# Patient Record
Sex: FEMALE | Race: WHITE | Employment: UNEMPLOYED | URBAN - METROPOLITAN AREA
[De-identification: names, ages, dates, MRNs, and addresses within clinical notes are randomized per-mention and may not be internally consistent; named-entity substitution may affect disease eponyms.]

---

## 2017-04-10 ENCOUNTER — APPOINTMENT (OUTPATIENT)
Dept: CT IMAGING | Age: 57
End: 2017-04-10
Attending: EMERGENCY MEDICINE
Payer: COMMERCIAL

## 2017-04-10 ENCOUNTER — HOSPITAL ENCOUNTER (EMERGENCY)
Age: 57
Discharge: HOME | End: 2017-04-10
Attending: EMERGENCY MEDICINE
Payer: COMMERCIAL

## 2017-04-10 VITALS
BODY MASS INDEX: 40.94 KG/M2 | HEART RATE: 79 BPM | HEIGHT: 69 IN | TEMPERATURE: 97.9 F | WEIGHT: 276.4 LBS | DIASTOLIC BLOOD PRESSURE: 82 MMHG | RESPIRATION RATE: 18 BRPM | OXYGEN SATURATION: 96 % | SYSTOLIC BLOOD PRESSURE: 143 MMHG

## 2017-04-10 DIAGNOSIS — H57.02 ANISOCORIA: Primary | ICD-10-CM

## 2017-04-10 LAB
ANION GAP BLD CALC-SCNC: 17 MMOL/L (ref 5–15)
ATRIAL RATE: 69 BPM
BUN BLD-MCNC: 8 MG/DL (ref 9–20)
CA-I BLD-MCNC: 1.16 MMOL/L (ref 1.12–1.32)
CALCULATED P AXIS, ECG09: 41 DEGREES
CALCULATED R AXIS, ECG10: -4 DEGREES
CALCULATED T AXIS, ECG11: 22 DEGREES
CHLORIDE BLD-SCNC: 105 MMOL/L (ref 98–107)
CO2 BLD-SCNC: 26 MMOL/L (ref 21–32)
CREAT BLD-MCNC: 0.6 MG/DL (ref 0.6–1.3)
DIAGNOSIS, 93000: NORMAL
GLUCOSE BLD-MCNC: 99 MG/DL (ref 65–100)
HCT VFR BLD CALC: 44 % (ref 35–47)
HGB BLD-MCNC: 15 GM/DL (ref 11.5–16)
P-R INTERVAL, ECG05: 162 MS
POTASSIUM BLD-SCNC: 3.7 MMOL/L (ref 3.5–5.1)
Q-T INTERVAL, ECG07: 428 MS
QRS DURATION, ECG06: 88 MS
QTC CALCULATION (BEZET), ECG08: 458 MS
SERVICE CMNT-IMP: ABNORMAL
SODIUM BLD-SCNC: 143 MMOL/L (ref 136–145)
VENTRICULAR RATE, ECG03: 69 BPM

## 2017-04-10 PROCEDURE — 96375 TX/PRO/DX INJ NEW DRUG ADDON: CPT

## 2017-04-10 PROCEDURE — 74011250636 HC RX REV CODE- 250/636: Performed by: EMERGENCY MEDICINE

## 2017-04-10 PROCEDURE — 74011000258 HC RX REV CODE- 258: Performed by: EMERGENCY MEDICINE

## 2017-04-10 PROCEDURE — 70496 CT ANGIOGRAPHY HEAD: CPT

## 2017-04-10 PROCEDURE — 74011000250 HC RX REV CODE- 250: Performed by: EMERGENCY MEDICINE

## 2017-04-10 PROCEDURE — 96374 THER/PROPH/DIAG INJ IV PUSH: CPT

## 2017-04-10 PROCEDURE — 93005 ELECTROCARDIOGRAM TRACING: CPT

## 2017-04-10 PROCEDURE — 74011636320 HC RX REV CODE- 636/320: Performed by: EMERGENCY MEDICINE

## 2017-04-10 PROCEDURE — 70450 CT HEAD/BRAIN W/O DYE: CPT

## 2017-04-10 PROCEDURE — 99285 EMERGENCY DEPT VISIT HI MDM: CPT

## 2017-04-10 PROCEDURE — 80047 BASIC METABLC PNL IONIZED CA: CPT

## 2017-04-10 RX ORDER — LEVOTHYROXINE SODIUM 112 UG/1
112 TABLET ORAL
COMMUNITY

## 2017-04-10 RX ORDER — TETRACAINE HYDROCHLORIDE 5 MG/ML
1 SOLUTION OPHTHALMIC
Status: COMPLETED | OUTPATIENT
Start: 2017-04-10 | End: 2017-04-10

## 2017-04-10 RX ORDER — DIPHENHYDRAMINE HYDROCHLORIDE 50 MG/ML
50 INJECTION, SOLUTION INTRAMUSCULAR; INTRAVENOUS
Status: COMPLETED | OUTPATIENT
Start: 2017-04-10 | End: 2017-04-10

## 2017-04-10 RX ORDER — GUAIFENESIN 100 MG/5ML
81 LIQUID (ML) ORAL DAILY
COMMUNITY

## 2017-04-10 RX ORDER — DILTIAZEM HYDROCHLORIDE 120 MG/1
120 CAPSULE, EXTENDED RELEASE ORAL DAILY
COMMUNITY

## 2017-04-10 RX ORDER — SODIUM CHLORIDE 0.9 % (FLUSH) 0.9 %
10 SYRINGE (ML) INJECTION
Status: COMPLETED | OUTPATIENT
Start: 2017-04-10 | End: 2017-04-10

## 2017-04-10 RX ORDER — PILOCARPINE HYDROCHLORIDE 10 MG/ML
1 SOLUTION/ DROPS OPHTHALMIC
Status: DISCONTINUED | OUTPATIENT
Start: 2017-04-10 | End: 2017-04-10 | Stop reason: HOSPADM

## 2017-04-10 RX ORDER — HYDROGEN PEROXIDE 3 %
20 SOLUTION, NON-ORAL MISCELLANEOUS DAILY
COMMUNITY

## 2017-04-10 RX ADMIN — METHYLPREDNISOLONE SODIUM SUCCINATE 125 MG: 125 INJECTION, POWDER, FOR SOLUTION INTRAMUSCULAR; INTRAVENOUS at 15:24

## 2017-04-10 RX ADMIN — SODIUM CHLORIDE 100 ML: 900 INJECTION, SOLUTION INTRAVENOUS at 15:35

## 2017-04-10 RX ADMIN — DIPHENHYDRAMINE HYDROCHLORIDE 50 MG: 50 INJECTION, SOLUTION INTRAMUSCULAR; INTRAVENOUS at 15:24

## 2017-04-10 RX ADMIN — Medication 10 ML: at 15:34

## 2017-04-10 RX ADMIN — TETRACAINE HYDROCHLORIDE 1 DROP: 5 SOLUTION OPHTHALMIC at 16:14

## 2017-04-10 RX ADMIN — IOPAMIDOL 100 ML: 755 INJECTION, SOLUTION INTRAVENOUS at 15:34

## 2017-04-10 NOTE — DISCHARGE INSTRUCTIONS
Learning About Anisocoria  What is anisocoria? Anisocoria (say \"an-eye-soh-KOR-ee-uh) is a difference in the size of your pupils. The pupil is the black area in the center of your iris. The iris is the colored part of your eye. The iris controls the pupil to let the right amount of light into the eye. In bright light, the pupil narrows (constricts) to let in less light. In dim light, the pupil widens (dilates) to let in more light. When you have anisocoria, one of your pupils does not constrict or dilate as well as the other one. So the pupils look uneven. A slight difference in the size of the pupils is normal for some people. But in other cases this condition is the result of a medical problem. Examples include an injury to the eye, an infection, or nerve damage. What are the symptoms? Having different-sized pupils usually doesn't cause any symptoms. And by itself, it is rarely a cause for concern. Anisocoria is more likely to be the sign of a serious problem if it occurs along with other symptoms, such as:  · A droopy upper eyelid. · Eye pain. · A severe headache. · Vision problems, such as double vision. · Loss of vision. Tell your doctor right away if you have any of these symptoms. What can you expect when you have it? An eye doctor (ophthalmologist) will do a complete eye exam. The doctor will:  · See how your pupils respond to bright and dim light. · Look at the inside of your eyes. · Check how well your eyes focus. The doctor might ask to see old photos to find out how long your pupils have been uneven. You may have other tests to help rule out a serious cause of uneven pupils. For example, the doctor might check how your eyes respond to eye drops. Or you might have an imaging test, such as an MRI. If a medical problem is causing the difference in pupil size, your treatment will depend on the cause.  You will not need any treatment if your uneven pupils are not linked to a medical problem. Follow-up care is a key part of your treatment and safety. Be sure to make and go to all appointments, and call your doctor if you are having problems. It's also a good idea to know your test results and keep a list of the medicines you take. Where can you learn more? Go to http://[facility].info/. Enter [id number] in the search box to learn more about \"Learning About Anisocoria. \"  Current as of: May 23, 2016  Content Version: 11.2  © 1760-4247 [employer]. Care instructions adapted under license by [employer] (which disclaims liability or warranty for this information). If you have questions about a medical condition or this instruction, always ask your healthcare professional. [address] any warranty or liability for your use of this information. We hope that we have addressed all of your medical concerns. The examination and treatment you received in the Emergency Department were for an emergent problem and were not intended as complete care. It is important that you follow up with your healthcare provider(s) for ongoing care. If your symptoms worsen or do not improve as expected, and you are unable to reach your usual health care provider(s), you should return to the Emergency Department. Today's healthcare is undergoing tremendous change, and patient satisfaction surveys are one of the many tools to assess the quality of medical care. You may receive a survey from the [employer] regarding your experience in the Emergency Department. I hope that your experience has been completely positive, particularly the medical care that I provided. As such, please participate in the survey; anything less than excellent does not meet my expectations or intentions. [facility] and [address] [place] [address] participate in nationally recognized quality of care measures.   If your blood pressure is greater than 120/80, as reported below, we urge that you seek medical care to address the potential of high blood pressure, commonly known as hypertension. Hypertension can be hereditary or can be caused by certain medical conditions, pain, stress, or \"white coat syndrome. \"       Please make an appointment with your health care provider(s) for follow up of your Emergency Department visit. VITALS:   Patient Vitals for the past 8 hrs:   Temp Pulse Resp BP SpO2   04/10/17 1224 98.4 °F (36.9 °C) 88 16 177/83 97 %          Thank you for allowing us to provide you with medical care today. We realize that you have many choices for your emergency care needs. Please choose us in the future for any continued health care needs. Mihir Jenkins, [address] [place] [address].   Office: [phone]            Recent Results (from the past 24 hour(s))   EKG, 12 LEAD, INITIAL    Collection Time: 04/10/17  2:31 PM   Result Value Ref Range    Ventricular Rate 67 BPM    Atrial Rate 67 BPM    P-R Interval 168 ms    QRS Duration 88 ms    Q-T Interval 428 ms    QTC Calculation (Bezet) 452 ms    Calculated P Axis 46 degrees    Calculated R Axis -6 degrees    Calculated T Axis 11 degrees    Diagnosis Normal sinus rhythm  No previous ECGs available      POC CHEM8    Collection Time: 04/10/17  2:43 PM   Result Value Ref Range    Calcium, ionized (POC) 1.16 1.12 - 1.32 MMOL/L    Sodium (POC) 143 136 - 145 MMOL/L    Potassium (POC) 3.7 3.5 - 5.1 MMOL/L    Chloride (POC) 105 98 - 107 MMOL/L    CO2 (POC) 26 21 - 32 MMOL/L    Anion gap (POC) 17 (H) 5 - 15 mmol/L    Glucose (POC) 99 65 - 100 MG/DL    BUN (POC) 8 (L) 9 - 20 MG/DL    Creatinine (POC) 0.6 0.6 - 1.3 MG/DL    GFR-AA (POC) >60 >60 ml/min/1.73m2    GFR, non-AA (POC) >60 >60 ml/min/1.73m2    Hemoglobin (POC) 15.0 11.5 - 16.0 GM/DL    Hematocrit (POC) 44 35.0 - 47.0 %    Comment Comment Not Indicated.          Ct Head Wo Cont    Result Date: 4/10/2017  INDICATION: Unequal pupils Exam: Noncontrast CT of the brain is performed with 5 mm collimation. CT dose reduction was achieved with the use of the standardized protocol tailored for this examination and automatic exposure control for dose modulation. FINDINGS: There is no acute intracranial hemorrhage, mass, mass effect or herniation. Ventricular system is normal. The gray-white matter differentiation is well-preserved. The mastoid air cells are well pneumatized. The visualized paranasal sinuses are normal.     IMPRESSION: No acute intracranial hemorrhage, mass or infarct. Cta Head Neck W Cont    Result Date: 4/10/2017  EXAM:  CTA HEAD NECK W CONT INDICATION:   dilated right pupil, eval for aneurysm, hx of thyroid cancer COMPARISON:  None. CONTRAST:  100 mL of Isovue-370. TECHNIQUE:  Unenhanced  images were obtained to localize the volume for acquisition. Multislice helical axial CT angiography was performed from the aortic arch to the top of the head during uneventful rapid bolus intravenous contrast administration. Coronal and sagittal reformations and 3D post processing was performed. CT dose reduction was achieved through use of a standardized protocol tailored for this examination and automatic exposure control for dose modulation. Adaptive statistical iterative reconstruction (ASIR) was utilized. FINDINGS: CTA NECK There is conventional three vessel arch anatomy. The bilateral subclavian, common carotid, and internal carotid arteries are patent with no flow-limiting stenosis. % of right carotid artery stenosis: 0. % of left carotid artery stenosis: 0. NASCET method was utilized for calculating stenosis. The vertebral arteries are codominant and patent. The cervical soft tissues are unremarkable. There are degenerative changes of the cervical spine. The right thyroid is surgically absent. Left thyroid appears unremarkable.  Soft tissues are otherwise grossly unremarkable. Desiccated debris seen within the sphenoid sinus. CTA HEAD The vertebral arteries are codominant. The basilar artery and its branches are normal. The internal carotid, anterior cerebral, and middle cerebral arteries are patent. There is no flow-limiting intracranial stenosis. There is no aneurysm. There are no sizable posterior communicating arteries. IMPRESSION:  No aneurysm, dissection, or large vessel occlusion. Incidental findings as above including chronic sphenoid sinus disease.

## 2017-04-10 NOTE — ED NOTES
Patient has received discharge instructions from ER MD, verbalizes understanding.   Ambulatory upon discharge with daughter

## 2017-04-10 NOTE — ED TRIAGE NOTES
TRIAGE NOTE: \"My daughter noticed that I have one pupil that is really dilated. I was at [facility] and they called 911 for me. The EMS providers told me I should come immediately to have a CT done to rule out an aneurysm. I had to wait for my daughter so someone from the school drove me over. \" Patient's right pupil is much larger than the left but reactive to light

## 2017-04-10 NOTE — ED PROVIDER NOTES
HPI Comments: 59 y.o. female with past medical history significant for thyroid CA, A-fib, inappropriate sinus tachycardia, and s/p partial thyroidectomy, and hysterectomy who presents from [address] [place] [address] via private vehicle with chief complaint of unilateral eye dilation. Pt reports that she is from [place] and was visiting campus with her daughter when the daughter noticed that the pt's right eye was significantly more dilated than the left. Pt was evaluated by EMS who told the pt to come to the ED to r/o aneurysm. She reports having no other complaints or any other signs of stroke on scene. Pt wears contacts and reports no changes to lenses or solution. Pt is on no new medications or eye drops. No head injury. No h/o HTN and pt is not on any anticoagulants. Pt reports an allergy to IVP dye - anaphylaxis. Pt has since had contrast dye without issue - she may have been pretreated. Pt denies all of the following: headache, nausea, facial droop, weakness, visual disturbance, and eye pain. There are no other acute medical concerns at this time. Social hx: Never smoker. No alcohol use. Pt is [facility] visit and lives in [place]. Note written by Arabella Yap, as dictated by Anton Krueger MD 2:02 PM      The history is provided by the patient. Past Medical History:   Diagnosis Date    Atrial fibrillation (HCC)     Inappropriate sinus tachycardia (HCC)     Thyroid cancer (HCC)        Past Surgical History:   Procedure Laterality Date    HX HYSTERECTOMY      HX PARTIAL THYROIDECTOMY           History reviewed. No pertinent family history. Social History     Social History    Marital status:      Spouse name: N/A    Number of children: N/A    Years of education: N/A     Occupational History    Not on file.      Social History Main Topics    Smoking status: Never Smoker    Smokeless tobacco: Not on file    Alcohol use Yes      Comment: on occasion     Drug use: Not on file    Sexual activity: Not on file     Other Topics Concern    Not on file     Social History Narrative    No narrative on file         ALLERGIES: Latex and Iodinated contrast media - oral and iv dye    Review of Systems   Eyes: Negative for photophobia, pain and visual disturbance. Right pupil dilated   Respiratory: Negative for cough and shortness of breath. Cardiovascular: Negative for chest pain. Gastrointestinal: Negative for abdominal pain, nausea and vomiting. Neurological: Negative for dizziness, speech difficulty, weakness and headaches. All other systems reviewed and are negative.       Vitals:    04/10/17 1224   BP: 177/83   Pulse: 88   Resp: 16   Temp: 98.4 °F (36.9 °C)   SpO2: 97%   Weight: 125.4 kg (276 lb 6.4 oz)   Height: 5' 9\" (1.753 m)            Physical Exam   Physical Examination: General appearance - alert, well appearing, and in no distress, oriented to person, place, and time and normal appearing weight  Eyes - right pupil more dilated than left, dilated 4mm, minimally reactive to light but has sluggish direct and consensual response, left pupil 2mm reacts to direct and consensual light, extraocular eye movements intact  Neck - supple, no significant adenopathy  Chest - clear to auscultation, no wheezes, rales or rhonchi, symmetric air entry  Heart - normal rate, regular rhythm, normal S1, S2, no murmurs, rubs, clicks or gallops  Abdomen - soft, nontender, nondistended, no masses or organomegaly  Back exam - full range of motion, no tenderness, palpable spasm or pain on motion  Neurological - alert, oriented, normal speech, no focal findings or movement disorder noted  Musculoskeletal - no joint tenderness, deformity or swelling  Extremities - peripheral pulses normal, no pedal edema, no clubbing or cyanosis  Skin - normal coloration and turgor, no rashes, no suspicious skin lesions noted  MDM  Number of Diagnoses or Management Options  Anisocoria:      Amount and/or Complexity of Data Reviewed  Clinical lab tests: ordered and reviewed  Tests in the radiology section of CPT®: ordered and reviewed  Obtain history from someone other than the patient: yes (daughter)  Discuss the patient with other providers: yes (Radiologist, neurology)  Independent visualization of images, tracings, or specimens: yes    Patient Progress  Patient progress: stable    ED Course       Procedures  EKG interpretation: (Preliminary)  Rhythm: normal sinus rhythm; and regular . Rate (approx.): 69; Axis: left axis deviation; DE interval: normal; QRS interval: normal ; ST/T wave: non-specific changes; Discussed with neurology, Dr. Monique Guzman. Agrees with plan and outpt f/u. CTA WNL. Symptoms improving. Will d/c with pcp/neurology f/u in [place]. Pt agrees with plan. Attempted pilocarpine drops without change in pupil size.